# Patient Record
(demographics unavailable — no encounter records)

---

## 2024-11-01 NOTE — HISTORY OF PRESENT ILLNESS
[TextBox_4] : Francisco is a 6 y.o. male being seen today for follow up for bedwetting. Initially seen 9/11/2024 where RBUS was unremarkable. Wet 3-4/7 nights per week which is an improvement from previously being wet 7/7 nights/week. Dad reports that they have no strictly enforced no food or fluids 2 hours before bed and he no longer wears a pull up. Has trialed lifting and the alarm inconsistently without improvements in symptoms. Last night, Francisco awakened himself to use the bathroom for the first time and did not wet the bed. Dad believes that he is starting to have increased awareness although the accidents are not bothersome to him. No daytime incontinence, UTIs or voiding complaints.

## 2024-11-01 NOTE — CONSULT LETTER
[FreeTextEntry1] : Dear Dr. Mari,   I had the pleasure of seeing Francisco for follow up today. Below is my note regarding the office visit today.    Thank you so very much for allowing me to participate in Francisco's care. Please don't hesitate to call me should any questions or issues arise .  Sincerely, John Ma MD, FACS, PU Chief, Pediatric Urology Professor of Urology and Pediatrics [facility] [employer] President, [employer] - [place] Section Past-President, Societies for Pediatric U[employer]

## 2024-11-01 NOTE — REASON FOR VISIT
[Follow-Up Visit] : a follow-up visit [Patient] : patient [Father] : father [TextBox_50] : bedwetting

## 2024-11-01 NOTE — CONSULT LETTER
[FreeTextEntry1] : Dear Dr. Mari,   I had the pleasure of seeing Francisco for follow up today. Below is my note regarding the office visit today.    Thank you so very much for allowing me to participate in Francisco's care. Please don't hesitate to call me should any questions or issues arise .  Sincerely, John Ma MD, FACS, PU Chief, Pediatric Urology Professor of Urology and Pediatrics [facility] [employer] President, [employer] - [place] Section Past-President, Societies [employer] Pediatric U[employer]

## 2024-11-01 NOTE — ASSESSMENT
[FreeTextEntry1] : Francisco has primary nocturnal enuresis. Pelvic US today was unremarkable and initial RBUS 9/11/2024 was also unremarkable. Now with increased awareness and less wet nights. Discussed strictly enforcing lifestyle factors. Will obtain CaCr to r/o hypercalciuria and will obtain UA with reflex culture per dad's request and to r/o infection although there are no s/s of UTI. Discussed decreasing bladder irritants as well. Dad expressed understanding of the plan and will return in 4 weeks in office for progress check and EMG/Uroflow to assess for dysfunctional voiding. All questions were answered.

## 2024-12-04 NOTE — DATA REVIEWED
[FreeTextEntry1] : EXAMINATION: US PELVIS TODAY IN OFFICE FINDINGS: UNREMARKABLE PELVIC STRUCTURES   EXAMINATION: EMG/UROFLOW PERFORMED IN THE OFFICE TODAY FINDINGS: PROLONGED FLOW WITHOUT BLADDER SPHINCTER DYSSYNERGIA; PVR 1 ML

## 2024-12-04 NOTE — REASON FOR VISIT
[Follow-Up Visit] : a follow-up visit [Enuresis] : enuresis [PCP] : ~pcp~ [Father] : father [TextBox_50] : EMG/Uroflow

## 2024-12-04 NOTE — ASSESSMENT
[FreeTextEntry1] : Francisco has primary nocturnal enuresis. Pelvic US today was unremarkable and initial RBUS 9/11/2024 was also unremarkable. Now with increased awareness and less wet nights (5/7). Today's EMG/Uroflow demonstrated a prolonged flow without bladder sphincter dyssynergia. Discussed strictly enforcing lifestyle factors. Will obtain CaCr to r/o hypercalciuria and will obtain UA with reflex culture per dad's request and to r/o infection although there are no s/s of UTI. Discussed decreasing bladder irritants as well. Dad expressed understanding of the plan and will return in 3-4 weeks in office for progress check by parent request. All questions were answered.

## 2024-12-04 NOTE — CONSULT LETTER
[FreeTextEntry1] : Dear Dr. Mari,   I had the pleasure of seeing Francisco for follow up today. Below is my note regarding the office visit today.    Thank you so very much for allowing me to participate in Francisco's care. Please don't hesitate to call me should any questions or issues arise .  Sincerely, John Ma MD, FACS, PU Chief, Pediatric Urology Professor of Urology and Pediatrics [facility] [employer] President, [employer] - [place] Section Past-President, Societies [employer] Pediatric U[employer]

## 2024-12-04 NOTE — HISTORY OF PRESENT ILLNESS
[TextBox_4] : Francisco is a 6 y.o. male being seen in follow up for EMG/Uroflow. Initial visit with unremarkable RBUS. Last seen 11/1/2024 where there was slight improvement in frequency of bedwetting, previously 7/7 which improved to 5/7 nights/week with lifestyle modifications. Since last being seen, remains wet 5/7 nights/week. Dad admits that enforcing the lifestyle factors is challenging with the patient's busy schedule. However, there is some progress and dad would prefer to stay away from meds at this time. No daytime incontinence, UTIs or voiding complaints.